# Patient Record
Sex: FEMALE | Race: WHITE
[De-identification: names, ages, dates, MRNs, and addresses within clinical notes are randomized per-mention and may not be internally consistent; named-entity substitution may affect disease eponyms.]

---

## 2018-05-02 ENCOUNTER — HOSPITAL ENCOUNTER (OUTPATIENT)
Dept: HOSPITAL 17 - HRAD | Age: 33
End: 2018-05-02
Payer: COMMERCIAL

## 2018-05-02 DIAGNOSIS — E28.9: Primary | ICD-10-CM

## 2018-05-02 PROCEDURE — 58340 CATHETER FOR HYSTEROGRAPHY: CPT

## 2018-05-02 PROCEDURE — 74740 X-RAY FEMALE GENITAL TRACT: CPT

## 2018-05-02 NOTE — RADRPT
EXAM DATE/TIME:  05/02/2018 07:43 

 

[facility] COMPARISON:     

No previous studies available for comparison.

 

                     

INDICATIONS :     

Ovarian dysfunction.

                     

 

FLUORO TIME:       

0.8 minutes

 

IMAGE COUNT:      

3

                     

 

CONTRAST:     

20 cc Omnipaque 300 (iohexol)  

                     

 

DEVICE:     

Tucker Cannula

                     

 

MEDICAL HISTORY :     

None.          

 

SURGICAL HISTORY :     

None.   

 

ENCOUNTER:     

Initial                                        

 

ACUITY:     

4 - 6  months      

 

PAIN SCORE:     

0/10

 

LOCATION:       

Cervix.  

 

 

FINDINGS:     

Preliminary  film is within normal limits.  Technical aspect of the cervical cannulation injection of
 contrast performed by the referring physician.  Examination is performed under fluoroscopic control.


 

There are 2 uterine horns with intercornual angle of 109. There is rapid filling of the fallopian tu
bes and free spillage into the pelvis bilaterally.

 

CONCLUSION:     

1. Fallopian tubes are patent bilaterally.

2. 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 This favors partial bicornu
ate uterus although partial septate uterus could have a similar appearance. MRI could help differenti
ate and further characterize, if needed clinically.

 

 

 

 Twin Nassar MD on May 02, 2018 at 8:23           

Board Certified Radiologist.

 This report was verified electronically.